# Patient Record
(demographics unavailable — no encounter records)

---

## 2024-10-29 NOTE — PHYSICAL EXAM
[Chaperone Present] : A chaperone was present in the examining room during all aspects of the physical examination [38114] : A chaperone was present during the pelvic exam. [Appropriately responsive] : appropriately responsive [Alert] : alert [Oriented x3] : oriented x3 [Labia Majora] : normal [Labia Minora] : normal [Normal] : normal [Enlarged ___ wks] : enlarged [unfilled] ~Uweeks [Uterine Adnexae] : normal [FreeTextEntry2] : Leana BAZAN-DONAL chaperoned during entire physical exam, [FreeTextEntry6] : mobile decent with traction

## 2024-10-29 NOTE — HISTORY OF PRESENT ILLNESS
[FreeTextEntry1] : Patient is a 43 yo female here today for second opinion for menorrhagia.    She is c/o very heavy periods, the first 3 days she changes super pad every 1 hour and periods last 7-10 days. this has been going on for over 10 years. Tried ocp for 3 month, had two periods in a month, then stopped. She has low ferritin/iron levels dx 2024 and had her first iron transfusion a few months ago.   She had a sonogram done in  that revealed adenomyosis.  no obvious fibroids.     2 c sections

## 2024-10-29 NOTE — PLAN
[FreeTextEntry1] :  discussed in detail treatment for AUB, menorrhagia and Adenomyosis  OCPs, IUD, hysterectomy risks and benefits of all discussed in detail. she failed OCPs and does not desire IUD  due to no desire for future pregnancies patient would be a great candidate for TV[facility] BS.  patient does not desire to do hyst right now.  she declines doing endo bx because of previous experience. Will plan D&C in near future and then will do hysterectomy in january  all of her questions regarding procedure were answered she is agreeable with plan.    I Leana BAZAN-DONAL am scribing for the presence of Dr. Lantigua the following sections HISTORY OF PRESENT ILLNESS, PAST MEDICAL/FAMILY/SOCIAL HISTORY; REVIEW OF SYSTEMS; VITAL SIGNS; PHYSICAL EXAM; DISPOSITION.    I personally performed the services described in the documentation, reviewed the documentation recorded by the scribe in my presence and it accurately and completely records my words and actions.

## 2024-10-29 NOTE — PHYSICAL EXAM
[Chaperone Present] : A chaperone was present in the examining room during all aspects of the physical examination [[id number]] : A chaperone was present during the pelvic exam. [Appropriately responsive] : appropriately responsive [Alert] : alert [Oriented x3] : oriented x3 [Labia Majora] : normal [Labia Minora] : normal [Normal] : normal [Enlarged ___ wks] : enlarged [unfilled] ~Uweeks [Uterine Adnexae] : normal [FreeTextEntry2] : Leana BAZAN-DONAL chaperoned during entire physical exam, [FreeTextEntry6] : mobile decent with traction

## 2024-11-14 NOTE — HISTORY OF PRESENT ILLNESS
[de-identified] : 2015.  Gastritis. [FreeTextEntry1] : 2015.  Internal hemorrhoids. [de-identified] : Negative US 2024.

## 2024-11-14 NOTE — REVIEW OF SYSTEMS
[As Noted in HPI] : as noted in HPI [Abdominal Pain] : abdominal pain [Vomiting] : no vomiting [Constipation] : no constipation [Diarrhea] : no diarrhea [Heartburn] : heartburn [Melena (black stool)] : no melena [Bleeding] : no bleeding [Fecal Incontinence (soiling)] : no fecal incontinence [Bloating (gassiness)] : no bloating [Swollen Glands] : no swollen glands [Negative] : Heme/Lymph

## 2024-11-14 NOTE — PHYSICAL EXAM
[Alert] : alert [Normal Voice/Communication] : normal voice/communication [Healthy Appearing] : healthy appearing [No Acute Distress] : no acute distress [Well Developed] : well developed [Well Nourished] : well nourished [Sclera] : the sclera and conjunctiva were normal [Hearing Threshold Finger Rub Not [place]] : hearing was normal [Normal Lips/Gums] : the lips and gums were normal [Oropharynx] : the oropharynx was normal [Normal Appearance] : the appearance of the neck was normal [No Neck Mass] : no neck mass was observed [No Respiratory Distress] : no respiratory distress [No Acc Muscle Use] : no accessory muscle use [Respiration, Rhythm And Depth] : normal respiratory rhythm and effort [Auscultation Breath Sounds / Voice Sounds] : lungs were clear to auscultation bilaterally [Heart Rate And Rhythm] : heart rate was normal and rhythm regular [Normal S1, S2] : normal S1 and S2 [Murmurs] : no murmurs [None] : no edema [Bowel Sounds] : normal bowel sounds [Abdomen Tenderness] : non-tender [No Masses] : no abdominal mass palpated [Abdomen Soft] : soft [] : no hepatosplenomegaly [No CVA Tenderness] : no CVA  tenderness [Abnormal Walk] : normal gait [No Joint Swelling] : no joint swelling seen [Normal Color / Pigmentation] : normal skin color and pigmentation [Oriented To Time, Place, And Person] : oriented to person, place, and time [de-identified] : Deferred at this time

## 2025-01-22 NOTE — PHYSICAL EXAM
[Chaperone Present] : A chaperone was present in the examining room during all aspects of the physical examination [[id number]] : A chaperone was present during the pelvic exam. [Appropriately responsive] : appropriately responsive [Alert] : alert [No Acute Distress] : no acute distress [No Lymphadenopathy] : no lymphadenopathy [Regular Rate Rhythm] : regular rate rhythm [No Murmurs] : no murmurs [Clear to Auscultation B/L] : clear to auscultation bilaterally [Soft] : soft [Non-tender] : non-tender [Non-distended] : non-distended [No HSM] : No HSM [No Lesions] : no lesions [No Mass] : no mass [Oriented x3] : oriented x3 [Labia Majora] : normal [Labia Minora] : normal [Normal] : normal [Uterine Adnexae] : normal [FreeTextEntry2] : SRINI AikenC

## 2025-01-22 NOTE — PLAN
[FreeTextEntry1] : PLAN: D&C hysteroscopy for menorrhagia  patient not interested in hysterectomy, pt prefers to wait at this time Discussed pre op and post op instructions in detail. Vaginal restrictions post op only all of patients questions regarding procedure were answered. consent signed she is to f/u with JW in 2 weeks post operatively. she is agreeable with plan.

## 2025-02-19 NOTE — HISTORY OF PRESENT ILLNESS
[Pain is well-controlled] : pain is well-controlled [Fever] : no fever [Chills] : no chills [Nausea] : no nausea [Vomiting] : no vomiting [None] : no vaginal bleeding [Normal] : normal [Pathology reviewed] : pathology reviewed [de-identified] : Patient is a 43 yo female here today post op s/p D&C hysteroscopy for menorrhagia 1/24/25

## 2025-02-19 NOTE — PLAN
[None] : None [FreeTextEntry1] : Pt to call with heavy bleeding or pain not controlled with NSAIDs Pathology reviewed today Pt to follow up for yearly annual, pt plans to transition care to this office  Supportive care measure discussed. All questions answered, pt agreeable with plan.